# Patient Record
Sex: FEMALE | Race: WHITE | Employment: OTHER | URBAN - METROPOLITAN AREA
[De-identification: names, ages, dates, MRNs, and addresses within clinical notes are randomized per-mention and may not be internally consistent; named-entity substitution may affect disease eponyms.]

---

## 2019-04-05 ENCOUNTER — OFFICE VISIT (OUTPATIENT)
Dept: INTERNAL MEDICINE CLINIC | Age: 74
End: 2019-04-05

## 2019-04-05 VITALS
SYSTOLIC BLOOD PRESSURE: 116 MMHG | BODY MASS INDEX: 20.41 KG/M2 | HEIGHT: 66 IN | RESPIRATION RATE: 18 BRPM | OXYGEN SATURATION: 92 % | HEART RATE: 72 BPM | WEIGHT: 127 LBS | DIASTOLIC BLOOD PRESSURE: 72 MMHG | TEMPERATURE: 97.8 F

## 2019-04-05 DIAGNOSIS — C18.1 PRIMARY APPENDICEAL ADENOCARCINOMA (HCC): ICD-10-CM

## 2019-04-05 DIAGNOSIS — I34.0 NON-RHEUMATIC MITRAL REGURGITATION: ICD-10-CM

## 2019-04-05 DIAGNOSIS — I25.10 CORONARY ARTERY DISEASE INVOLVING NATIVE CORONARY ARTERY OF NATIVE HEART WITHOUT ANGINA PECTORIS: ICD-10-CM

## 2019-04-05 DIAGNOSIS — I48.20 CHRONIC ATRIAL FIBRILLATION (HCC): ICD-10-CM

## 2019-04-05 PROBLEM — I10 HYPERTENSION: Status: ACTIVE | Noted: 2019-04-05

## 2019-04-05 PROBLEM — E11.9 CONTROLLED TYPE 2 DIABETES MELLITUS WITHOUT COMPLICATION, WITHOUT LONG-TERM CURRENT USE OF INSULIN (HCC): Status: ACTIVE | Noted: 2019-04-05

## 2019-04-05 PROBLEM — M85.88 OSTEOPENIA OF SPINE: Status: ACTIVE | Noted: 2019-04-05

## 2019-04-05 RX ORDER — WARFARIN 2.5 MG/1
7.5 TABLET ORAL
COMMUNITY

## 2019-04-05 RX ORDER — COLESEVELAM 180 1/1
1875 TABLET ORAL 2 TIMES DAILY WITH MEALS
COMMUNITY

## 2019-04-05 RX ORDER — PHENOL/SODIUM PHENOLATE
20 AEROSOL, SPRAY (ML) MUCOUS MEMBRANE 2 TIMES DAILY
COMMUNITY
End: 2019-07-05

## 2019-04-05 RX ORDER — ALBUTEROL SULFATE 90 UG/1
AEROSOL, METERED RESPIRATORY (INHALATION)
COMMUNITY

## 2019-04-05 RX ORDER — LORATADINE 10 MG/1
10 TABLET ORAL
COMMUNITY

## 2019-04-05 RX ORDER — GUAIFENESIN 600 MG/1
600 TABLET, EXTENDED RELEASE ORAL 2 TIMES DAILY
COMMUNITY
End: 2020-02-24

## 2019-04-05 RX ORDER — FLUTICASONE PROPIONATE 50 MCG
2 SPRAY, SUSPENSION (ML) NASAL DAILY
COMMUNITY

## 2019-04-05 RX ORDER — GLUCOSAMINE/CHONDR SU A SOD 750-600 MG
TABLET ORAL
COMMUNITY

## 2019-04-05 RX ORDER — AZELASTINE HCL 205.5 UG/1
SPRAY NASAL 2 TIMES DAILY
COMMUNITY

## 2019-04-05 NOTE — PROGRESS NOTES
Chief Complaint Patient presents with  Follow-up  
  colon cancer 1. Have you been to the ER, urgent care clinic since your last visit? Hospitalized since your last visit? No 
 
2. Have you seen or consulted any other health care providers outside of the [address] [place] [address] since your last visit? Include any pap smears or colon screening.  No

## 2019-04-05 NOTE — PATIENT INSTRUCTIONS
Adenocarcinoma of appendiceal region>>Dr. Belén Wilson to address need for adjunctive therapy (Radiation) Shortness of breath from IPF>>Dr. Jah Calderon to address treatment options Atrial Fibrillation>>Coumadin>>[facility] Mitral Valve Repair>>recent echo fine

## 2019-04-05 NOTE — PROGRESS NOTES
HPI:  
 
This lady came in to establish medical care without any of her old records available for review. 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 The lymph nodes were negative and DNA mismatch study still pending. She has a follow up with Dr. Brandee Long next week to discuss these findings and recommendations for adjunctive therapy if any. She developed postoperative hypotension with passage of bloody stools and was resuscitated with IVF and PRBC. This stopped on its own and her Coumadin since resumed. She developed CHF following volume resuscitation but the management of this condition was not discussed in discharge summary but echocardiogram revealed hyperdynamic systolic function with EF 75%. Her INR is being monitored by [facility] and has not had any prior bleeding complications until recently. The dose is being adjusted to target INR. She was also provided booklet on IPF treatment to consider but is worried about side effects and is interested in new drug developed in [facility] to \"reverse fibrosis. \". She has Multaq associated pulmonary fibrosis and has chronic respiratory failure with continuous O2 at 3 liters/min via NC. She also informs me that mother  while she was hospitalized. Her son had made a brief visit and is back in [facility]. Past Medical History:  
Diagnosis Date  Arthropathy, unspecified, site unspecified  Asthma  Back pain  Chronic atrial fibrillation ([address] 2019 INR per [facility]  
 Controlled type 2 diabetes mellitus without complication, without long-term current use of insulin ([address] 2019  Decreased exercise tolerance  Diabetes mellitus ([address] 4 x years  DVT (deep venous thrombosis) ([address]  Esophageal reflux  History of blood transfusion  Hypertension  Mitral valve prolapse 2008  Osteopenia of spine 4/5/2019 Tama Harder Pacemaker 2008  Primary appendiceal adenocarcinoma [facility] 4/5/2019 Right hemicolectomy Past Surgical History:  
Procedure Laterality Date  CYSTOSCOPY,RESEC EJACULATORY DUCT  9/30/2010  HX CHOLECYSTECTOMY  HX COLECTOMY  HX COLONOSCOPY    
 HX CORONARY ARTERY BYPASS GRAFT  2009  HX CORONARY STENT PLACEMENT    
 HX GI    
 HX MITRAL VALVE REPLACEMENT    
 HX PACEMAKER  9/08  HX TONSILLECTOMY Current Outpatient Medications Medication Sig  Omeprazole delayed release (PRILOSEC D/R) 20 mg tablet Take 20 mg by mouth two (2) times a day.  warfarin (COUMADIN) 2.5 mg tablet Take 2.5 mg by mouth daily.  albuterol (PROAIR HFA) 90 mcg/actuation inhaler Take  by inhalation.  azelastine (ASTEPRO) 0.15 % (205.5 mcg) two (2) times a day.  fluticasone propionate (FLONASE) 50 mcg/actuation nasal spray 2 Sprays by Both Nostrils route daily.  guaiFENesin ER (MUCINEX) 600 mg ER tablet Take 600 mg by mouth two (2) times a day.  loratadine (CLARITIN) 10 mg tablet Take 10 mg by mouth.  colesevelam (WELCHOL) 625 mg tablet Take 1,875 mg by mouth two (2) times daily (with meals).  Biotin 2,500 mcg cap Take  by mouth.  warfarin (COUMADIN) 5 mg tablet Take 5 mg by mouth daily.  gabapentin (NEURONTIN) 300 mg capsule Take 300 mg by mouth three (3) times daily.  folic acid (FOLVITE) 1 mg tablet Take  by mouth daily.  cholecalciferol (VITAMIN D3) 1,000 unit tablet Take  by mouth daily.  sotalol (BETAPACE) 80 mg tablet Take  by mouth.  CARVEDILOL PO Take  by mouth.  spironolactone (ALDACTONE) 25 mg tablet Take  by mouth daily.  POTASSIUM CHLORIDE (K+ POTASSIUM PO) Take  by mouth.  NIACIN (NIASPAN EXTENDED-RELEASE PO) Take  by mouth.  pantoprazole (PROTONIX) 40 mg tablet Take 40 mg by mouth daily. No current facility-administered medications for this visit. Allergies and Intolerances: Allergies Allergen Reactions  Dronedarone Other (comments) Pulmonary Fibrosis Pulmonary toxicity  Amoxicillin-Pot Clavulanate Shortness of Breath and Unknown (comments) Shortness of breath  Crestor [Rosuvastatin] Other (comments) Myalgia.  Grass Pollen-[place], Standard Unknown (comments)  Nitrofuran Analogues Other (comments)  Other Medication Unknown (comments) Dust  
 Pravastatin Other (comments) Fatigue  Sulfamethoxazole-Trimethoprim Other (comments) Family History:  
Family History Problem Relation Age of Onset  Heart Disease Father  Cancer Mother  Pulmonary Embolism Sister Social History: She  reports that she quit smoking about 56 years ago. Her smoking use included cigarettes. She has a 0.30 pack-year smoking history. She has never used smokeless tobacco.  
Social History Substance and Sexual Activity Alcohol Use Yes  Alcohol/week: 3.0 oz  Types: 6 Standard drinks or equivalent per week  Frequency: 2-4 times a month  Drinks per session: 1 or 2 Immunization History:   Flu, Prevnar, Pneumovax,  
 
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 
 
ROS Physical:  
Visit Vitals /72 (BP 1 Location: Left arm, BP Patient Position: Sitting) Pulse 72 Temp 97.8 °F (36.6 °C) (Oral) Resp 18 Ht 5' 6\" (1.676 m) Wt 127 lb (57.6 kg) SpO2 92% BMI 20.50 kg/m² Physical Exam  
Constitutional: She is well-developed, well-nourished, and in no distress. HENT:  
Mouth/Throat: Oropharynx is clear and moist. No oropharyngeal exudate. Eyes: Conjunctivae are normal. No scleral icterus. Neck: No JVD present. Cardiovascular: Normal rate and normal heart sounds.  An irregular rhythm present. Exam reveals no gallop and no S3. No murmur heard. Pulmonary/Chest:  
Rales both lung fields Abdominal: Soft. Bowel sounds are normal. There is no tenderness. Musculoskeletal: She exhibits no edema. Lymphadenopathy:  
  She has no cervical adenopathy. Skin: She is not diaphoretic. No cyanosis. Nails show no clubbing. Review of Data: 
Labs: Impression/Plan: 
 Patient Active Problem List  
Diagnosis  Code  Primary appendiceal adenocarcinoma [facility]) Awaiting recommendations by Dr. Aissatou Garcia following review of pathology C18.1  Pulmonary fibrosis (HCC) with pulmonary hypertension and chronic hypoxemia Decision regarding antifibrotic agent pending J84.10  Anemia from post-operative GI bleeding, resolved following transfusion Repeat CBC with next visit C90.00  Chronic AF on [facility] INR per [facility] E78.5  Essential hypertension controlled Continue current regimen I10  
 CHF resolved Continue current regimen I49.5  T2DM vs IGT off pharmacotherapy Obtain old records R73.09 Yenifer Reyes MD

## 2019-04-22 ENCOUNTER — TELEPHONE (OUTPATIENT)
Dept: INTERNAL MEDICINE CLINIC | Age: 74
End: 2019-04-22

## 2019-04-22 NOTE — TELEPHONE ENCOUNTER
Pt calling in stating that she is done with Home Health and would like to start Physical Therapy for shoulders,neck, and loss of balance w/dizziness at the [address] [place] [address] and [address] [place] [address] (p) [phone]; please send referral

## 2019-04-23 DIAGNOSIS — M54.9 UPPER BACK PAIN: ICD-10-CM

## 2019-04-23 DIAGNOSIS — R42 DIZZINESS: Primary | ICD-10-CM

## 2019-06-06 ENCOUNTER — TELEPHONE (OUTPATIENT)
Dept: INTERNAL MEDICINE CLINIC | Age: 74
End: 2019-06-06

## 2019-06-06 NOTE — TELEPHONE ENCOUNTER
Pt called in stating that she would like a lab order with urine to be put in so she can come in a week before scheduled appointment. Pt is also requesting a referral to dermatology. Please call pt and let her know when they are put into the system.

## 2019-06-11 DIAGNOSIS — N30.00 ACUTE CYSTITIS WITHOUT HEMATURIA: Primary | ICD-10-CM

## 2019-07-05 ENCOUNTER — OFFICE VISIT (OUTPATIENT)
Dept: INTERNAL MEDICINE CLINIC | Age: 74
End: 2019-07-05

## 2019-07-05 ENCOUNTER — HOSPITAL ENCOUNTER (OUTPATIENT)
Dept: LAB | Age: 74
Discharge: HOME | End: 2019-07-05
Payer: MEDICARE

## 2019-07-05 VITALS
TEMPERATURE: 98.7 F | WEIGHT: 127 LBS | SYSTOLIC BLOOD PRESSURE: 98 MMHG | DIASTOLIC BLOOD PRESSURE: 53 MMHG | HEIGHT: 66 IN | BODY MASS INDEX: 20.41 KG/M2 | OXYGEN SATURATION: 92 % | HEART RATE: 70 BPM | RESPIRATION RATE: 18 BRPM

## 2019-07-05 DIAGNOSIS — I10 ESSENTIAL HYPERTENSION: ICD-10-CM

## 2019-07-05 DIAGNOSIS — I48.20 CHRONIC ATRIAL FIBRILLATION (HCC): Primary | ICD-10-CM

## 2019-07-05 DIAGNOSIS — E55.9 VITAMIN D DEFICIENCY: ICD-10-CM

## 2019-07-05 DIAGNOSIS — D50.9 IRON DEFICIENCY ANEMIA, UNSPECIFIED IRON DEFICIENCY ANEMIA TYPE: ICD-10-CM

## 2019-07-05 DIAGNOSIS — M85.88 OSTEOPENIA OF SPINE: ICD-10-CM

## 2019-07-05 DIAGNOSIS — N30.00 ACUTE CYSTITIS WITHOUT HEMATURIA: ICD-10-CM

## 2019-07-05 DIAGNOSIS — E11.9 CONTROLLED TYPE 2 DIABETES MELLITUS WITHOUT COMPLICATION, WITHOUT LONG-TERM CURRENT USE OF INSULIN (HCC): ICD-10-CM

## 2019-07-05 PROBLEM — R73.03 PREDIABETES: Status: ACTIVE | Noted: 2019-07-05

## 2019-07-05 PROBLEM — N39.0 RECURRENT UTI: Status: ACTIVE | Noted: 2019-07-05

## 2019-07-05 PROBLEM — I27.20 PULMONARY HYPERTENSION (HCC): Status: ACTIVE | Noted: 2019-07-05

## 2019-07-05 PROBLEM — R54 FRAILTY: Status: ACTIVE | Noted: 2019-07-05

## 2019-07-05 LAB
ALBUMIN SERPL-MCNC: 3.9 G/DL (ref 3.4–5)
ALBUMIN/GLOB SERPL: 1 {RATIO} (ref 0.8–1.7)
ALP SERPL-CCNC: 95 U/L (ref 45–117)
ALT SERPL-CCNC: 16 U/L (ref 13–56)
ANION GAP SERPL CALC-SCNC: 4 MMOL/L (ref 3–18)
APPEARANCE UR: ABNORMAL
AST SERPL-CCNC: 18 U/L (ref 15–37)
BACTERIA URNS QL MICRO: ABNORMAL /HPF
BILIRUB SERPL-MCNC: 0.5 MG/DL (ref 0.2–1)
BILIRUB UR QL: NEGATIVE
BUN SERPL-MCNC: 14 MG/DL (ref 7–18)
BUN/CREAT SERPL: 14 (ref 12–20)
CALCIUM SERPL-MCNC: 9 MG/DL (ref 8.5–10.1)
CHLORIDE SERPL-SCNC: 100 MMOL/L (ref 100–108)
CO2 SERPL-SCNC: 32 MMOL/L (ref 21–32)
COLOR UR: YELLOW
CREAT SERPL-MCNC: 1.01 MG/DL (ref 0.6–1.3)
EPITH CASTS URNS QL MICRO: ABNORMAL /LPF (ref 0–5)
ERYTHROCYTE [DISTWIDTH] IN BLOOD BY AUTOMATED COUNT: 14.2 % (ref 11.6–14.5)
FERRITIN SERPL-MCNC: 106 NG/ML (ref 8–388)
GLOBULIN SER CALC-MCNC: 4.1 G/DL (ref 2–4)
GLUCOSE SERPL-MCNC: 99 MG/DL (ref 74–99)
GLUCOSE UR STRIP.AUTO-MCNC: NEGATIVE MG/DL
HCT VFR BLD AUTO: 40 % (ref 35–45)
HGB BLD-MCNC: 12.4 G/DL (ref 12–16)
HGB UR QL STRIP: NEGATIVE
IRON SATN MFR SERPL: 33 %
IRON SERPL-MCNC: 107 UG/DL (ref 50–175)
KETONES UR QL STRIP.AUTO: NEGATIVE MG/DL
LEUKOCYTE ESTERASE UR QL STRIP.AUTO: ABNORMAL
MCH RBC QN AUTO: 30.7 PG (ref 24–34)
MCHC RBC AUTO-ENTMCNC: 31 G/DL (ref 31–37)
MCV RBC AUTO: 99 FL (ref 74–97)
NITRITE UR QL STRIP.AUTO: NEGATIVE
PH UR STRIP: 5 [PH] (ref 5–8)
PLATELET # BLD AUTO: 241 K/UL (ref 135–420)
PMV BLD AUTO: 9.5 FL (ref 9.2–11.8)
POTASSIUM SERPL-SCNC: 5.1 MMOL/L (ref 3.5–5.5)
PROT SERPL-MCNC: 8 G/DL (ref 6.4–8.2)
PROT UR STRIP-MCNC: NEGATIVE MG/DL
RBC # BLD AUTO: 4.04 M/UL (ref 4.2–5.3)
RBC #/AREA URNS HPF: NEGATIVE /HPF (ref 0–5)
SODIUM SERPL-SCNC: 136 MMOL/L (ref 136–145)
SP GR UR REFRACTOMETRY: 1.01 (ref 1–1.03)
TIBC SERPL-MCNC: 325 UG/DL (ref 250–450)
UROBILINOGEN UR QL STRIP.AUTO: 0.2 EU/DL (ref 0.2–1)
WBC # BLD AUTO: 9.1 K/UL (ref 4.6–13.2)
WBC URNS QL MICRO: >100 /HPF (ref 0–4)

## 2019-07-05 PROCEDURE — 36415 COLL VENOUS BLD VENIPUNCTURE: CPT

## 2019-07-05 PROCEDURE — 82306 VITAMIN D 25 HYDROXY: CPT

## 2019-07-05 PROCEDURE — 87086 URINE CULTURE/COLONY COUNT: CPT

## 2019-07-05 PROCEDURE — 87077 CULTURE AEROBIC IDENTIFY: CPT

## 2019-07-05 PROCEDURE — 83540 ASSAY OF IRON: CPT

## 2019-07-05 PROCEDURE — 85027 COMPLETE CBC AUTOMATED: CPT

## 2019-07-05 PROCEDURE — 81001 URINALYSIS AUTO W/SCOPE: CPT

## 2019-07-05 PROCEDURE — 80053 COMPREHEN METABOLIC PANEL: CPT

## 2019-07-05 PROCEDURE — 87186 SC STD MICRODIL/AGAR DIL: CPT

## 2019-07-05 PROCEDURE — 82728 ASSAY OF FERRITIN: CPT

## 2019-07-05 NOTE — PATIENT INSTRUCTIONS
IPF>>consider Esbriet and continue oxygen therapy Atrial Fibrillation>>Betapace and Coumadin>>INR through [facility] Cholesterol>>intolerance to statin Cecal cancer>>surgical cure>>PET scan negative Prediabetes>>diet Back Pain>>Gabapentin Recurrent UTI>>repeat urine culture

## 2019-07-05 NOTE — PROGRESS NOTES
Chief Complaint   Patient presents with    Cholesterol Problem     1. Have you been to the ER, urgent care clinic since your last visit? Hospitalized since your last visit? No    2. Have you seen or consulted any other health care providers outside of the [address] [place] [address] since your last visit? Include any pap smears or colon screening.  No

## 2019-07-05 NOTE — PROGRESS NOTES
HPI:     This lady presents to the office for follow up and reports recent consultation with Dr. Erica Pineda following her hemicolectomy for appendiceal adenocarcinoma. She is considered a surgical cure and has been released from his care. Dr. Ar Greenfield ordered a PET scan that was negative and has a follow up arranged to see him in 3 months. There was an uptake in the distal right tibia that was suspected to a benign enchondroma but this finding was not discussed with her. She was advised by Dr. Taty Arboleda to consider Bernardo Flight for treatment of her drug induced pulmonary fibrosis. She was concerned about side effect and was still hoping that a drug introduced in [facility] that is supposed to reverse fibrosis becomes available. The source of this information was an ancillary health personnel at Dr. Nica Bermudez office and I cannot find any literature on this. She continues to wear O2 continuously. She has not had any recent exacerbation. She continues to have her INR checked at [facility] and denies any bleeding complications. She is not aware of any palpitations on Betapace. She has a well seated bio[prosthetic mitral valve and normal LVEF on most recent echocardiogram.    She has documented statin intolerance and had been taking Welchol for diarrhea but stopped this since developing constipation after her hemicolectomy. She is aware that her iron replacement therapy is likely worsening this condition. She had previously been diagnosed with T2DM while on steroid therapy for MM but this was improved to IGT level and is not currently on any type of medication. She has recurrent UTI and is afraid to continue to take antibiotics. I told her that I would obtain urine culture and treat this if she is symptomatic if that is a concern. She remains on Gabapentin for peripheral neuropathy and back pain and would like this refilled. It is not clear if the effect has been significant. She denies any side effect from this medication.     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 She has a learning disabled 36year old nephew that lives with her that provides some measure of help. Her son lives in [place]. Past Medical History:   Diagnosis Date    Arthropathy, unspecified, site unspecified     Asthma     Back pain     Chronic atrial fibrillation (HCC) 4/5/2019    INR per [facility]    Controlled type 2 diabetes mellitus without complication, without long-term current use of insulin ([address] 4/5/2019    Decreased exercise tolerance     Diabetes mellitus (HCC) 4 x years    DVT (deep venous thrombosis) (HCC)     Esophageal reflux     History of blood transfusion     Hypertension     Mitral valve prolapse 2008    Osteopenia of spine 4/5/2019    Pacemaker 2008    Prediabetes 7/5/2019    Primary appendiceal adenocarcinoma ([address] 4/5/2019    Right hemicolectomy    Pulmonary hypertension ([address] 7/5/2019     Past Surgical History:   Procedure Laterality Date    HX CHOLECYSTECTOMY      HX COLECTOMY      HX COLONOSCOPY      HX CORONARY ARTERY BYPASS GRAFT  2009    HX CORONARY STENT PLACEMENT      HX GI      HX MITRAL VALVE REPLACEMENT      HX PACEMAKER  9/08    HX TONSILLECTOMY       Current Outpatient Medications   Medication Sig    warfarin (COUMADIN) 2.5 mg tablet Take 2.5 mg by mouth daily.  albuterol (PROAIR HFA) 90 mcg/actuation inhaler Take  by inhalation.  azelastine (ASTEPRO) 0.15 % (205.5 mcg) two (2) times a day.  fluticasone propionate (FLONASE) 50 mcg/actuation nasal spray 2 Sprays by Both Nostrils route daily.  guaiFENesin ER (MUCINEX) 600 mg ER tablet Take 600 mg by mouth two (2) times a day.  loratadine (CLARITIN) 10 mg tablet Take 10 mg by mouth.  colesevelam (WELCHOL) 625 mg tablet Take 1,875 mg by mouth two (2) times daily (with meals).  Biotin 2,500 mcg cap Take  by mouth.  warfarin (COUMADIN) 5 mg tablet Take 5 mg by mouth daily.     sotalol (BETAPACE) 80 mg tablet Take  by mouth.  gabapentin (NEURONTIN) 300 mg capsule Take 300 mg by mouth three (3) times daily.  spironolactone (ALDACTONE) 25 mg tablet Take  by mouth daily.  folic acid (FOLVITE) 1 mg tablet Take  by mouth daily.  cholecalciferol (VITAMIN D3) 1,000 unit tablet Take  by mouth daily. No current facility-administered medications for this visit. Allergies and Intolerances: Allergies   Allergen Reactions    Dronedarone Other (comments)     Pulmonary Fibrosis  Pulmonary toxicity    Amoxicillin-Pot Clavulanate Shortness of Breath and Unknown (comments)     Shortness of breath    Crestor [Rosuvastatin] Other (comments)     Myalgia.  Grass Pollen-[place], Standard Unknown (comments)    Nitrofuran Analogues Other (comments)    Other Medication Unknown (comments)     Dust    Pravastatin Other (comments)     Fatigue      Sulfamethoxazole-Trimethoprim Other (comments)     Family History:   Family History   Problem Relation Age of Onset    Heart Disease Father     Cancer Mother     Pulmonary Embolism Sister      Social History:   She  reports that she quit smoking about 57 years ago. Her smoking use included cigarettes. She has a 0.30 pack-year smoking history. She has never used smokeless tobacco.   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 Negative for chills, fever and weight loss. HENT: Negative for hearing loss.     Respiratory: Positive for cough and shortness of breath. Cardiovascular: Negative for chest pain and palpitations. Gastrointestinal: Positive for constipation. Negative for heartburn. Genitourinary: Positive for urgency. Endo/Heme/Allergies: Does not bruise/bleed easily. Psychiatric/Behavioral: The patient has insomnia. Physical:   Visit Vitals  BP 98/53 (BP 1 Location: Left arm, BP Patient Position: Sitting)   Pulse 70   Temp 98.7 °F (37.1 °C) (Oral)   Resp 18   Ht 5' 6\" (1.676 m)   Wt 127 lb (57.6 kg)   SpO2 92%   BMI 20.50 kg/m²          Physical Exam   Constitutional:   Frail pleasant lady with pallid complexion on O2   HENT:   Mouth/Throat: Oropharynx is clear and moist.   Eyes: No scleral icterus. Neck: No JVD present. Cardiovascular: Normal rate, regular rhythm and normal heart sounds. Pulmonary/Chest:           Abdominal: Soft. Bowel sounds are normal. She exhibits no mass. There is no tenderness. Musculoskeletal: She exhibits no edema. Lymphadenopathy:     She has no cervical adenopathy. Neurological: She is alert. Skin: No cyanosis. Nails show no clubbing. Psychiatric: Affect normal.   Vitals reviewed. Review of Data:    Reviewed data from [employer] [employer]:       Impression/Plan:   Patient Active Problem List   Diagnosis  Code    Primary appendiceal adenocarcinoma [facility]) with surgical cure Surveillance and management per Dr. Tracee Dawson, determine if endoscopic surveillance recommended   C18.1    Pulmonary fibrosis ([facility] secondary to Multaq Management per Dr. Regis Gambino   J84.10    Severe pulmonary hypertension secondary to above Address underlying etiology, continue O2   I27.20    PAF suppressed on Betapace INR per [facility]   I48.2    MR with bioprosthetic valve replacement IE prophylaxis applies   I34.0    IGT from insulin resistance CHO restriction and repeat fasting glucose next visit       Hyperlipidemia with statin intolerance Repeat FLP next visit, consider Zetia   E78.5    Recurrent UTI likely from atrophic urethritis and vaginitis Determine if topical estrogen considered   N39.0    Osteopenia of spine Obtain copy of last DEXA and determine FRAX score, continue D3 supplements, dietary calcium   M85.88    Peripheral neuropathy likely from IGT Refill Gabapentin   I48.2    Frailty Need to consider future living arrangements.  Oni Mendiola MD

## 2019-07-06 LAB — 25(OH)D3 SERPL-MCNC: 19 NG/ML (ref 30–100)

## 2019-07-07 ENCOUNTER — TELEPHONE (OUTPATIENT)
Dept: INTERNAL MEDICINE CLINIC | Age: 74
End: 2019-07-07

## 2019-07-07 LAB
BACTERIA SPEC CULT: ABNORMAL
SERVICE CMNT-IMP: ABNORMAL

## 2019-07-07 RX ORDER — CEPHALEXIN 250 MG/1
250 CAPSULE ORAL 2 TIMES DAILY
Qty: 14 CAP | Refills: 0 | Status: SHIPPED | OUTPATIENT
Start: 2019-07-07 | End: 2019-07-14

## 2019-07-07 NOTE — TELEPHONE ENCOUNTER
Discussed results with patient and advised:    1. Vitamin D3 2000 units a day    2. Will treat UTI since symptomatic with Keflex 250mg bid x 7 days. Avoid Cipro because of interaction with Sotalol and Coumadin and rest because of allergies. 3. Had questions about PET results and discussed this with her and counseled to speak to Dr. Shania Ann about it.

## 2019-10-08 PROBLEM — C18.1 CANCER OF APPENDIX (HCC): Status: ACTIVE | Noted: 2019-03-11
